# Patient Record
Sex: FEMALE | Race: WHITE | URBAN - METROPOLITAN AREA
[De-identification: names, ages, dates, MRNs, and addresses within clinical notes are randomized per-mention and may not be internally consistent; named-entity substitution may affect disease eponyms.]

---

## 2018-03-20 ENCOUNTER — TELEPHONE (OUTPATIENT)
Dept: POSTPARTUM | Facility: CLINIC | Age: 28
End: 2018-03-20

## 2018-03-20 NOTE — TELEPHONE ENCOUNTER
Satya Mills has been breast feeding her 8 week old son and then supplementing with expressed breast milk or formula afterwards at the recommendation of her baby's PCP due to slow weight gain  Recently Yolanda's pumping volumes have decreased and she is needing to give more formula  She would like an appointment for evaluation  An appointment was made for Thursday for further assistance  I instructed Yolanda to bring her pump with her

## 2018-03-22 ENCOUNTER — OFFICE VISIT (OUTPATIENT)
Dept: POSTPARTUM | Facility: CLINIC | Age: 28
End: 2018-03-22
Payer: COMMERCIAL

## 2018-03-22 VITALS
RESPIRATION RATE: 18 BRPM | DIASTOLIC BLOOD PRESSURE: 96 MMHG | SYSTOLIC BLOOD PRESSURE: 140 MMHG | TEMPERATURE: 99.2 F | HEART RATE: 108 BPM

## 2018-03-22 DIAGNOSIS — Z71.89 ENCOUNTER FOR BREAST FEEDING COUNSELING: Primary | ICD-10-CM

## 2018-03-22 DIAGNOSIS — O92.70 LACTATION PROBLEM: ICD-10-CM

## 2018-03-22 PROCEDURE — 99404 PREV MED CNSL INDIV APPRX 60: CPT | Performed by: PEDIATRICS

## 2018-03-22 NOTE — PROGRESS NOTES
INITIAL BREAST FEEDING EVALUATION    Informant/Relationship: Yolanda    Discussion of General Lactation Issues: Jb Ruiz has concerns with her supply  She has been trying multiple herbal supplements for the last 3 weeks but she still needs to supplement Peyton Dimes after nursing  Peyton Dimes has been supplemented since birth  Infant is 7weeks old today   History:  Fertility Problem:no  Breast changes:yes - breasts got just a little bit bigger  : no  due to failed induction  Full term:no  36 4/7 weeks   labor:no  First nursing/attempt < 1 hour after birth:yes - went well  Skin to skin following delivery:yes - in the delivery room  Breast changes after delivery:no  Rooming in (infant in room with mother with exception of procedures, eg  Circumcision: yes - only in the nursery a few hours the first night  Blood sugar issues:no  NICU stay:No  Jaundice:yes - mild  Phototherapy:yes - for about 24 hours  Supplement given: (list supplement and method used as well as reason(s):yes - formula via syringe    No past medical history on file  No current outpatient prescriptions on file  Allergies not on file    History   Drug use: Unknown       Social History  Never a smoker    Interval Breastfeeding History:    Frequency of breast feeding:  At least every 3 hours  Does mother feel breastfeeding is effective: No  Does infant appear satisfied after nursing:No Baby cries and looks for more feeding  Stooling pattern normal: lYes  Urinating frequently:Yes  Using shield or shells: No    Alternative/Artificial Feedings:   Bottle: Yes, after every feeding at the breast  Cup: No  Syringe/Finger: No           Formula Type: Parents' Choice Cow milk formula                     Amount: 3-4 ounces            Breast Milk:                      Amount: 0 5ounces            Frequency Q 3 Hr between feedings  Elimination Problems: No      Equipment:  Nipple Shield             Type: none             Size: n/a Frequency of Use: n/a  Pump            Type: Medela Pump in Style            Frequency of Use: After every feeding  Expresses about 0 5ounces each session  Shells            Type: none            Frequency of use: n/a    Equipment Problems: no    Mom:  Breast: Symmetric medium sized breasts with elastic, everted nipples  Breasts are very soft  Not much glandular tissue palpable  Nipple Assessment in General: Normal: elongated/eraser, no discoloration and no damage noted  Mother's Awareness of Feeding Cues                 Recognizes: Yes                  Verbalizes: Yes  Support System: FOB, extended family  History of Breastfeeding: none  Changes/Stressors/Violence: -DV  Concerns/Goals: Yolanda would prefer to EBF if possible    Problems with Mom: low supply    Physical Exam   Constitutional: She is oriented to person, place, and time  She appears well-developed and well-nourished  Neck: Normal range of motion  Cardiovascular: Normal rate, regular rhythm and normal heart sounds  Pulmonary/Chest: Effort normal and breath sounds normal    Musculoskeletal: Normal range of motion  Neurological: She is alert and oriented to person, place, and time  Skin: Skin is warm and dry  Psychiatric: She has a normal mood and affect  Her behavior is normal  Judgment and thought content normal        Infant:  Behaviors: Alert  Color: Pink  Birth weight: 2550gm  Current weight: 3745gm    Problems with infant: ineffective breast feeding      General Appearance:  Alert, active, no distress                             Head:  Normocephalic, AFOF, sutures opposed                             Eyes:  Conjunctiva clear, no drainage                              Ears:  Normally placed, no anomolies                             Nose:  no drainage or erythema                           Mouth:  No lesions  Good suck                        Neck:  Supple, symmetrical, trachea midline                 Respiratory:  No grunting, flaring, retractions, breath sounds clear and equal            Cardiovascular:  Regular rate and rhythm  No murmur  Adequate perfusion/capillary refill  Femoral pulse present                    Abdomen:   Soft, non-tender, no masses, bowel sounds present, no HSM             Genitourinary:  Normal male, testes descended, no discharge, swelling, or pain, anus patent                          Spine:   No abnormalities noted        Musculoskeletal:  Full range of motion          Skin/Hair/Nails:   Skin warm, dry, and intact, no rashes or abnormal dyspigmentation or lesions                Neurologic:   No abnormal movement, tone appropriate for gestational age    [place] Latch:  Efficiency:               Lips Flanged: YesYes              Depth of latch:Good              Audible Swallow: Yes, Rare until SNS in place              Visible Milk: No              Wide Open/ Asymmetrical: Yes, after repositioning              Suck Swallow Cycle: Breathing: unlabored, Coordinated: yes  Nipple Assessment after latch: Normal: elongated/eraser, no discoloration and no damage noted  Latch Problems: Initially Jb Ruiz was compressing the breast in a way that made it too wide for Maurizio to latch deeply  He would attempt and suck but would repeatedly loose the latch  With some adjustment Peyton Rivas was able to latch effective and nursed on the right breast briefly  Very little swallowing was noted and he quickly fell asleep  We became frustrated when he was moved to the left breast and he would not latch until an SNS was in place  He nursed until the supplement bottle was empty  Position:  Infant's Ergonomics/Body               Body Alignment: Yes               Head Supported: Yes               Close to Mom's body/ Lifted/ Supported: Yes               Mom's Ergonomics/Body: Yes                           Supported:  Yes                           Sitting Back: Yes                           Brings Baby to her breast: Yes  Positioning Problems: None      Handouts:   Storing human milk, Paced bottle feeding, Hands on pumping, Hand expression, Increasing your supply and Cleaning your pump, Preparing Formula    Education:  Reviewed Latch: Compress the breast so it is narrow for baby to get a deep latch  Reviewed Frequency/Supply & Demand: Emptying your breasts often and effectively can help you increase your supply  Reviewed Infant:Cues and varied States of Awareness  Reviewed Alternative/Artificial Feedings: Paced bottle feeding, SNS  Reviewed Equipment: Medela Pump in Style  Plan for breastfeeding    Reassurance and support given  Reviewed early signs of hunger, including tensing of hands and shoulders - no need to wait for open eyes  Offering the breast at early cues can make latching easier  Reviewed normal sucking patterns: transition from stimulation to nutritive to release or non-nutritive  Listen for a prolonged pattern of suck-swallow-breath  Reviewed normal nursing pattern: infant should nurse for at least 5 minutes or until releases on own  Demonstrated ways to hold breast to facilitate latch: simple lift or "sandwich" v "taco"  Discussed difference in sensation of non-nutritive v nutritive sucking  Galactogogues discuseed (note if fenugeek or mother's milk tea  Hand out regarding galactagogues given to provide more information since Matt Molina is already taking them  Increase frequency of expression  Continue pumping often to help increase your supply  Use hands on pumping and hand expression to increase stimulation to the breasts  Cycle your pump through stimulation and expression mode several time  Manual expression demonstrated  Hand out given  Supplementation recommended (document method-education if necessary)  Expressed breast milk or formula via paced bottle feeding or SNS  Use of pump demonstrated  Medela Pump in Style  Breast milk storage discussed: 6-8 hours at room temp, 2-3 months freezer   Do not add fresh milk to frozen   Based on today's weight, [place] should take at least 21 ounces every 24 hours  Please call with any questions or concerns

## 2018-03-22 NOTE — PATIENT INSTRUCTIONS
Plan for breastfeeding    Reassurance and support given  Reviewed early signs of hunger, including tensing of hands and shoulders - no need to wait for open eyes  Offering the breast at early cues can make latching easier  Reviewed normal sucking patterns: transition from stimulation to nutritive to release or non-nutritive  Listen for a prolonged pattern of suck-swallow-breath  Reviewed normal nursing pattern: infant should nurse for at least 5 minutes or until releases on own  Demonstrated ways to hold breast to facilitate latch: simple lift or "sandwich" v "taco"  Discussed difference in sensation of non-nutritive v nutritive sucking  Galactogogues discuseed (note if fenugeek or mother's milk tea  Hand out regarding galactagogues given to provide more information since Laurita Guzman is already taking them  Increase frequency of expression  Continue pumping often to help increase your supply  Use hands on pumping and hand expression to increase stimulation to the breasts  Cycle your pump through stimulation and expression mode several time  Manual expression demonstrated  Hand out given  Supplementation recommended (document method-education if necessary)  Expressed breast milk or formula via paced bottle feeding or SNS  Use of pump demonstrated  Medela Pump in Style  Breast milk storage discussed: 6-8 hours at room temp, 2-3 months freezer  Do not add fresh milk to frozen   Based on today's weight, Orma Ask should take at least 21 ounces every 24 hours  Please call with any questions or concerns

## 2018-03-29 ENCOUNTER — OFFICE VISIT (OUTPATIENT)
Dept: POSTPARTUM | Facility: CLINIC | Age: 28
End: 2018-03-29
Payer: COMMERCIAL

## 2018-03-29 DIAGNOSIS — Z71.89 ENCOUNTER FOR BREAST FEEDING COUNSELING: Primary | ICD-10-CM

## 2018-03-29 DIAGNOSIS — O92.70 LACTATION PROBLEM: ICD-10-CM

## 2018-03-29 PROCEDURE — 99403 PREV MED CNSL INDIV APPRX 45: CPT | Performed by: PEDIATRICS

## 2018-03-29 NOTE — PROGRESS NOTES
BREAST FEEDING FOLLOW UP VISIT    Informant/Relationship: Yolanda    Discussion of General Lactation Issues: Umer Holland does not feel that her supply has increased  She continues to take multiple supplements in an attempt to increase her supply  She has an appointment with a holistic medicine center for evaluation and treatment of low supply issues  With the paced bottle feeding, Pérez Soliz is taking less supplementation  She was not comfortable with the SNS  She also has questions about possible prescription medications that can help increase supply    Infant is 7 weeks old today  Interval Breastfeeding History:    Frequency of breast feeding: Every 3 hours on demand  Does mother feel breastfeeding is effective: No  Does infant appear satisfied after nursing:No  Stooling pattern normal:Yes  Urinating frequently:Yes  Using shield or shells:No    Alternative/Artificial Feedings:   Bottle: Yes, after every feeding at the breast  Cup: No  Syringe/Finger: No           Formula Type: Parents' Choice Cow Milk formula                     Amount: 3 ounces            Breast Milk:                      Amount: 0 5 ounce            Frequency Q 3 Hr between feedings  Elimination Problems: No      Equipment:  Nipple Shield             Type: none             Size: n/a             Frequency of Use: n/a  Pump            Type: Medela pump in Style            Frequency of Use: After every feeding  Expresses about 0 5 ounces each session  Shells            Type: none            Frequency of use: n/a    Equipment Problems: nono      Mom:  Breast: Symmetric medium sized breasts with elastic, everted nipples  Breasts are very soft  Not much glandular tissue palpable  Nipple Assessment in General: Normal: elongated/eraser, no discoloration and no damage noted  Mother's Awareness of Feeding Cues                 Recognizes:  Yes                  Verbalizes: Yes  Support System: FOB, extended family  History of Breastfeeding: none Changes/Stressors/Violence: -DV  Concerns/Goals: Larry Larson would prefer to EBF if possible  Problems with Mom: low supply    Physical Exam   Constitutional: She is oriented to person, place, and time  She appears well-developed and well-nourished  Neck: Normal range of motion  Pulmonary/Chest: Effort normal    Musculoskeletal: Normal range of motion  Neurological: She is alert and oriented to person, place, and time  Psychiatric: She has a normal mood and affect  Her behavior is normal  Judgment and thought content normal        Infant:  Behaviors: Sleepy  Color: Pink  Birth weight: 2550gm  Current weight: 3945gm    Problems with infant: none      General Appearance:  Alert, active, no distress                             Head:  Normocephalic                             Eyes:  Conjunctiva clear, no drainage                              Ears:  Normally placed, no anomolies                             Nose:  no drainage or erythema                           Mouth:  No lesions                    Neck:  Supple, symmetrical, trachea midline                 Respiratory:  No grunting, flaring, retractions, breath sounds clear and equal            Cardiovascular:  Regular rate and rhythm  No murmur  Adequate perfusion/capillary refill  Abdomen:   Soft, non-tender, no masses, bowel sounds present, no HSM             Genitourinary:  Normal male, testes descended, no discharge, swelling, or pain                          Spine:   No abnormalities noted        Musculoskeletal:  Full range of motion          Skin/Hair/Nails:   Skin warm, dry, and intact, no rashes or abnormal dyspigmentation or lesions                Neurologic:   No abnormal movement, tone appropriate for gestational age    Pumping session:  Yolanda wanted to focus on pumping today since Maura Swift was just fed prior to the visit  She began pumping with a 30mm flange and a fair amount of areola was being pulled into the tunnel    She switched to 24mm flanges and the fit appeared more appropriate  She reported that the pumping was comfortable  She cycled her pump through stimulation and expression mode several times  She used massage and breast compression to effectively empty her breasts  She expressed <0 5 ounces of milk  She had just pumped <1 hour prior to the appointment  Her milk was spoon fed to [place]  Handouts:   None    Education:  Reviewed Frequency/Supply & Demand: Continue with frequent breast stimulation in an attempt to increase supply  Reviewed Alternative/Artificial Feedings: Paced bottle feeding  Reviewed Equipment: Medela Pump in Style      Plan for breastfeeding    Reassurance and support given  Continue with frequent, effective pumping after nursing in an attempt to increase supply  Call if you would like to speak with Dr Hedy Odell about prescribed medication to increase supply  Please call with any questions or concerns

## 2018-03-29 NOTE — PATIENT INSTRUCTIONS
Plan for breastfeeding    Reassurance and support given  Continue with frequent, effective pumping after nursing in an attempt to increase supply  Call if you would like to speak with Dr Magi Gill about prescribed medication to increase supply  Please call with any questions or concerns

## 2018-04-06 NOTE — PROGRESS NOTES
I have reviewed the notes, assessments, and/or procedures performed by Jose Ramon Florez RN, CLC, I concur with her/his documentation of Rebekah Salazar

## 2018-04-07 NOTE — PROGRESS NOTES
I have reviewed the notes, assessments, and/or procedures performed by Genesis Kc RN, CLC, I concur with her/his documentation of Royal Leggett